# Patient Record
Sex: MALE | URBAN - NONMETROPOLITAN AREA
[De-identification: names, ages, dates, MRNs, and addresses within clinical notes are randomized per-mention and may not be internally consistent; named-entity substitution may affect disease eponyms.]

---

## 2021-05-17 ENCOUNTER — APPOINTMENT (OUTPATIENT)
Dept: URBAN - NONMETROPOLITAN AREA CLINIC 54 | Age: 81
Setting detail: DERMATOLOGY
End: 2021-05-17

## 2021-05-17 VITALS — HEIGHT: 71 IN | WEIGHT: 280 LBS

## 2021-05-17 VITALS — TEMPERATURE: 97.3 F | WEIGHT: 280 LBS | HEIGHT: 71 IN

## 2021-05-17 DIAGNOSIS — B07.8 OTHER VIRAL WARTS: ICD-10-CM

## 2021-05-17 PROBLEM — D48.5 NEOPLASM OF UNCERTAIN BEHAVIOR OF SKIN: Status: ACTIVE | Noted: 2021-05-17

## 2021-05-17 PROCEDURE — OTHER MIPS QUALITY: OTHER

## 2021-05-17 PROCEDURE — OTHER SHAVE REMOVAL: OTHER

## 2021-05-17 PROCEDURE — OTHER ADDITIONAL NOTES: OTHER

## 2021-05-17 PROCEDURE — OTHER COUNSELING: OTHER

## 2021-05-17 PROCEDURE — 11306 SHAVE SKIN LESION 0.6-1.0 CM: CPT

## 2021-05-17 PROCEDURE — A4550 SURGICAL TRAYS: HCPCS

## 2021-05-17 ASSESSMENT — LOCATION SIMPLE DESCRIPTION DERM: LOCATION SIMPLE: RIGHT THUMB

## 2021-05-17 ASSESSMENT — LOCATION DETAILED DESCRIPTION DERM: LOCATION DETAILED: RIGHT DISTAL RADIAL THUMB

## 2021-05-17 ASSESSMENT — LOCATION ZONE DERM: LOCATION ZONE: FINGER

## 2021-05-17 NOTE — HPI: WARTS (VERRUCA)
How Severe Are Your Warts?: moderate
Is This A New Presentation, Or A Follow-Up?: Wart
Treatment Number (Optional): 1
Additional History: Patient states his growth has been previously scraped and frozen.

## 2021-05-17 NOTE — PROCEDURE: MIPS QUALITY
Detail Level: Detailed
Quality 431: Preventive Care And Screening: Unhealthy Alcohol Use - Screening: Patient screened for unhealthy alcohol use using a single question and scores less than 2 times per year
Quality 130: Documentation Of Current Medications In The Medical Record: Current Medications Documented
Quality 226: Preventive Care And Screening: Tobacco Use: Screening And Cessation Intervention: Patient screened for tobacco use and is an ex/non-smoker
Statement Selected

## 2021-05-17 NOTE — PROCEDURE: SHAVE REMOVAL
Anesthesia Type: 1% lidocaine with epinephrine
Detail Level: Detailed
Add Variable For Additional Medical Justification: No
X Size Of Lesion In Cm (Optional): 1
Bill For Surgical Tray: yes
Medical Necessity Information: It is in your best interest to select a reason for this procedure from the list below. All of these items fulfill various CMS LCD requirements except the new and changing color options.
Path Notes (To The Dermatopathologist): Please check margins.
Post-Care Instructions: I reviewed with the patient in detail post-care instructions. Patient is to keep the biopsy site dry overnight, and then apply bacitracin twice daily until healed. Patient may apply hydrogen peroxide soaks to remove any crusting.
Billing Type: Third-Party Bill
Hemostasis: Aluminum Chloride
Consent was obtained from the patient. The risks and benefits to therapy were discussed in detail. Specifically, the risks of infection, scarring, bleeding, prolonged wound healing, incomplete removal, allergy to anesthesia, nerve injury and recurrence were addressed. Prior to the procedure, the treatment site was clearly identified and confirmed by the patient. All components of Universal Protocol/PAUSE Rule completed.
Biopsy Method: Dermablade
Notification Instructions: Patient will be notified of pathology results. However, patient instructed to call the office if not contacted within 2 weeks.
Wound Care: Petrolatum
Medical Necessity Clause: This procedure was medically necessary because the lesion that was treated was:

## 2021-06-14 ENCOUNTER — APPOINTMENT (OUTPATIENT)
Dept: URBAN - NONMETROPOLITAN AREA CLINIC 54 | Age: 81
Setting detail: DERMATOLOGY
End: 2021-06-14

## 2021-06-14 VITALS — TEMPERATURE: 97.3 F

## 2021-06-14 DIAGNOSIS — Z87.2 PERSONAL HISTORY OF DISEASES OF THE SKIN AND SUBCUTANEOUS TISSUE: ICD-10-CM

## 2021-06-14 DIAGNOSIS — L57.0 ACTINIC KERATOSIS: ICD-10-CM

## 2021-06-14 DIAGNOSIS — L82.0 INFLAMED SEBORRHEIC KERATOSIS: ICD-10-CM

## 2021-06-14 DIAGNOSIS — D18.0 HEMANGIOMA: ICD-10-CM

## 2021-06-14 DIAGNOSIS — D36.1 BENIGN NEOPLASM OF PERIPHERAL NERVES AND AUTONOMIC NERVOUS SYSTEM: ICD-10-CM

## 2021-06-14 DIAGNOSIS — L82.1 OTHER SEBORRHEIC KERATOSIS: ICD-10-CM

## 2021-06-14 PROBLEM — D36.11 BENIGN NEOPLASM OF PERIPHERAL NERVES AND AUTONOMIC NERVOUS SYSTEM OF FACE, HEAD, AND NECK: Status: ACTIVE | Noted: 2021-06-14

## 2021-06-14 PROBLEM — D18.01 HEMANGIOMA OF SKIN AND SUBCUTANEOUS TISSUE: Status: ACTIVE | Noted: 2021-06-14

## 2021-06-14 PROBLEM — C44.622 SQUAMOUS CELL CARCINOMA OF SKIN OF RIGHT UPPER LIMB, INCLUDING SHOULDER: Status: ACTIVE | Noted: 2021-06-14

## 2021-06-14 PROCEDURE — 17110 DESTRUCT B9 LESION 1-14: CPT

## 2021-06-14 PROCEDURE — OTHER PRESCRIPTION MEDICATION MANAGEMENT: OTHER

## 2021-06-14 PROCEDURE — OTHER ADDITIONAL NOTES: OTHER

## 2021-06-14 PROCEDURE — OTHER MIPS QUALITY: OTHER

## 2021-06-14 PROCEDURE — OTHER COUNSELING: OTHER

## 2021-06-14 PROCEDURE — OTHER LIQUID NITROGEN: OTHER

## 2021-06-14 PROCEDURE — 99213 OFFICE O/P EST LOW 20 MIN: CPT | Mod: 25

## 2021-06-14 PROCEDURE — OTHER MONITORING: OTHER

## 2021-06-14 PROCEDURE — OTHER PRESCRIPTION: OTHER

## 2021-06-14 RX ORDER — FLUOROURACIL 2 G/40G
CREAM TOPICAL BID
Qty: 1 | Refills: 1 | Status: ERX | COMMUNITY
Start: 2021-06-14

## 2021-06-14 ASSESSMENT — LOCATION SIMPLE DESCRIPTION DERM
LOCATION SIMPLE: RIGHT ANTERIOR NECK
LOCATION SIMPLE: LEFT FOREARM
LOCATION SIMPLE: CHEST
LOCATION SIMPLE: RIGHT FOREARM
LOCATION SIMPLE: RIGHT CALF
LOCATION SIMPLE: UPPER BACK
LOCATION SIMPLE: ABDOMEN

## 2021-06-14 ASSESSMENT — LOCATION DETAILED DESCRIPTION DERM
LOCATION DETAILED: LEFT DISTAL DORSAL FOREARM
LOCATION DETAILED: LEFT MEDIAL SUPERIOR CHEST
LOCATION DETAILED: RIGHT CLAVICULAR NECK
LOCATION DETAILED: SUPERIOR THORACIC SPINE
LOCATION DETAILED: RIGHT PROXIMAL DORSAL FOREARM
LOCATION DETAILED: RIGHT PROXIMAL LATERAL CALF
LOCATION DETAILED: RIGHT DISTAL CALF
LOCATION DETAILED: PERIUMBILICAL SKIN

## 2021-06-14 ASSESSMENT — LOCATION ZONE DERM
LOCATION ZONE: TRUNK
LOCATION ZONE: LEG
LOCATION ZONE: ARM
LOCATION ZONE: NECK

## 2021-06-14 NOTE — PROCEDURE: PRESCRIPTION MEDICATION MANAGEMENT
Render In Strict Bullet Format?: No
Detail Level: Zone
Initiate Treatment: Efudex 5 % topical cream BID\\nDays Supply: 30\\nSig: Apply to AA of forearm BID x 4 weeks

## 2021-06-14 NOTE — PROCEDURE: ADDITIONAL NOTES
Render Risk Assessment In Note?: no
Additional Notes: Biopsy proven. Patient followed up with Dr. Klein who recommended referral to Dr. Frances for Mohs. Dr. Frances, however, does not accept patient's insurance. Per patient, Dr. Klein and Dr. Frances are coming up with a plan for treatment and he is awaiting a call from their offices.
Detail Level: Simple

## 2021-06-14 NOTE — PROCEDURE: LIQUID NITROGEN
Post-Care Instructions: I reviewed with the patient in detail post-care instructions. Patient is to wear sunprotection, and avoid picking at any of the treated lesions. Pt may apply Vaseline to crusted or scabbing areas.
Medical Necessity Clause: This procedure was medically necessary because the lesions that were treated were:
Consent: The patient's consent was obtained including but not limited to risks of crusting, scabbing, blistering, scarring, darker or lighter pigmentary change, recurrence, incomplete removal and infection.
Medical Necessity Information: It is in your best interest to select a reason for this procedure from the list below. All of these items fulfill various CMS LCD requirements except the new and changing color options.
Add 52 Modifier (Optional): no
Duration Of Freeze Thaw-Cycle (Seconds): 5-10
Number Of Freeze-Thaw Cycles: 2 freeze-thaw cycles
Include Z78.9 (Other Specified Conditions Influencing Health Status) As An Associated Diagnosis?: Yes
Detail Level: Detailed

## 2021-08-09 ENCOUNTER — APPOINTMENT (OUTPATIENT)
Dept: URBAN - NONMETROPOLITAN AREA CLINIC 54 | Age: 81
Setting detail: DERMATOLOGY
End: 2021-08-09

## 2021-08-09 DIAGNOSIS — Z85.828 PERSONAL HISTORY OF OTHER MALIGNANT NEOPLASM OF SKIN: ICD-10-CM

## 2021-08-09 DIAGNOSIS — Z87.2 PERSONAL HISTORY OF DISEASES OF THE SKIN AND SUBCUTANEOUS TISSUE: ICD-10-CM

## 2021-08-09 DIAGNOSIS — L57.0 ACTINIC KERATOSIS: ICD-10-CM

## 2021-08-09 PROCEDURE — OTHER MONITORING: OTHER

## 2021-08-09 PROCEDURE — OTHER MIPS QUALITY: OTHER

## 2021-08-09 PROCEDURE — OTHER SKIN CANCER FOLLOW-UP: OTHER

## 2021-08-09 PROCEDURE — OTHER PRESCRIPTION MEDICATION MANAGEMENT: OTHER

## 2021-08-11 ENCOUNTER — APPOINTMENT (OUTPATIENT)
Dept: URBAN - NONMETROPOLITAN AREA CLINIC 54 | Age: 81
Setting detail: DERMATOLOGY
End: 2021-08-11

## 2021-08-11 DIAGNOSIS — Z87.2 PERSONAL HISTORY OF DISEASES OF THE SKIN AND SUBCUTANEOUS TISSUE: ICD-10-CM

## 2021-08-11 DIAGNOSIS — L98419 CHRONIC ULCER OF OTHER SPECIFIED SITES: ICD-10-CM

## 2021-08-11 DIAGNOSIS — Z85.828 PERSONAL HISTORY OF OTHER MALIGNANT NEOPLASM OF SKIN: ICD-10-CM

## 2021-08-11 DIAGNOSIS — L57.0 ACTINIC KERATOSIS: ICD-10-CM

## 2021-08-11 DIAGNOSIS — L98429 CHRONIC ULCER OF OTHER SPECIFIED SITES: ICD-10-CM

## 2021-08-11 PROBLEM — L97.219 NON-PRESSURE CHRONIC ULCER OF RIGHT CALF WITH UNSPECIFIED SEVERITY: Status: ACTIVE | Noted: 2021-08-11

## 2021-08-11 PROBLEM — C44.92 SQUAMOUS CELL CARCINOMA OF SKIN, UNSPECIFIED: Status: ACTIVE | Noted: 2021-08-11

## 2021-08-11 PROCEDURE — OTHER COUNSELING: OTHER

## 2021-08-11 PROCEDURE — OTHER DEFER: OTHER

## 2021-08-11 PROCEDURE — OTHER ADDITIONAL NOTES: OTHER

## 2021-08-11 PROCEDURE — OTHER SKIN CANCER FOLLOW-UP: OTHER

## 2021-08-11 PROCEDURE — OTHER MONITORING: OTHER

## 2021-08-11 PROCEDURE — OTHER PRESCRIPTION MEDICATION MANAGEMENT: OTHER

## 2021-08-11 PROCEDURE — 99213 OFFICE O/P EST LOW 20 MIN: CPT

## 2021-08-11 PROCEDURE — OTHER MIPS QUALITY: OTHER

## 2021-08-11 ASSESSMENT — LOCATION DETAILED DESCRIPTION DERM
LOCATION DETAILED: RIGHT PROXIMAL CALF
LOCATION DETAILED: DORSAL INTERPHALANGEAL JOINT RIGHT THUMB

## 2021-08-11 ASSESSMENT — LOCATION SIMPLE DESCRIPTION DERM
LOCATION SIMPLE: RIGHT CALF
LOCATION SIMPLE: RIGHT THUMB

## 2021-08-11 ASSESSMENT — LOCATION ZONE DERM
LOCATION ZONE: LEG
LOCATION ZONE: FINGER

## 2021-08-11 NOTE — PROCEDURE: SKIN CANCER FOLLOW-UP
Detail Level: Detailed
Additional Instructions: Standard
Invasive Melanoma Counseling: I stressed the importance of regular monthly self-examinations. They should examine their entire skin surface. The buttocks, gluteal cleft, genitalia and bottom of the feet should be examined with a hand held mirror. A significant other should help them if they are comfortable with this.

## 2021-08-11 NOTE — PROCEDURE: DEFER
Detail Level: Detailed
Introduction Text (Please End With A Colon): The following procedure was deferred:
Other Procedure: Cryosurgery

## 2021-08-11 NOTE — PROCEDURE: ADDITIONAL NOTES
Detail Level: Simple
Render Risk Assessment In Note?: no
Additional Notes: Biopsy proven. Patient followed up with Dr. Klein who recommended referral to Dr. Frances for Mohs. Dr. Frances, however, does not accept patient's insurance. He has been waiting to hear from Dr. Frances about alternative options but has yet to be contacted. Will place a call to Dr. Frances's office in order to see if he has made any progress on finding and alternative treatment option.\\n\\nBiopsy site is well-healed with no current evidence of recurrence.
Additional Notes: Vaseline and bandage applied. Patient states he accidently aggravated this site.
Additional Notes: Patient states he only remembered to use the cream “sometimes” and reports no superficial reaction. Recommended cryosurgery to treat hyperkeratotic lesions today. He is having a surgery next week and would like to postpone cryosurgery treatment until his next office visit.

## 2021-08-11 NOTE — PROCEDURE: PRESCRIPTION MEDICATION MANAGEMENT
Continue Regimen: Efudex 5 % topical cream BID\\nDays Supply: 30\\nSig: Apply to AA of forearm BID x 4 weeks
Detail Level: Zone
Render In Strict Bullet Format?: No

## 2022-03-01 ENCOUNTER — APPOINTMENT (OUTPATIENT)
Dept: URBAN - NONMETROPOLITAN AREA CLINIC 54 | Age: 82
Setting detail: DERMATOLOGY
End: 2022-03-01

## 2022-03-01 DIAGNOSIS — L57.0 ACTINIC KERATOSIS: ICD-10-CM

## 2022-03-01 DIAGNOSIS — Z87.2 PERSONAL HISTORY OF DISEASES OF THE SKIN AND SUBCUTANEOUS TISSUE: ICD-10-CM

## 2022-03-01 DIAGNOSIS — Z85.828 PERSONAL HISTORY OF OTHER MALIGNANT NEOPLASM OF SKIN: ICD-10-CM

## 2022-03-01 PROCEDURE — OTHER MONITORING: OTHER

## 2022-03-01 PROCEDURE — OTHER ADDITIONAL NOTES: OTHER

## 2022-03-01 PROCEDURE — OTHER SKIN CANCER FOLLOW-UP: OTHER

## 2022-03-01 PROCEDURE — OTHER MIPS QUALITY: OTHER

## 2022-03-01 ASSESSMENT — LOCATION DETAILED DESCRIPTION DERM: LOCATION DETAILED: DORSAL INTERPHALANGEAL JOINT RIGHT THUMB

## 2022-03-01 ASSESSMENT — LOCATION ZONE DERM: LOCATION ZONE: FINGER

## 2022-03-01 ASSESSMENT — LOCATION SIMPLE DESCRIPTION DERM: LOCATION SIMPLE: RIGHT THUMB

## 2022-03-01 NOTE — PROCEDURE: ADDITIONAL NOTES
Detail Level: Simple
Additional Notes: Biopsy proven. Patient followed up with Dr. Klein who recommended referral to Dr. Frances for Mohs. Dr. Frances, however, does not accept patient's insurance. He has been waiting to hear from Dr. Frances about alternative options but has yet to be contacted. Will place a call to Dr. Frances's office in order to see if he has made any progress on finding and alternative treatment option.\\n\\nBiopsy site is well-healed with no current evidence of recurrence.
Render Risk Assessment In Note?: no

## 2022-10-07 ENCOUNTER — APPOINTMENT (OUTPATIENT)
Dept: URBAN - NONMETROPOLITAN AREA CLINIC 54 | Age: 82
Setting detail: DERMATOLOGY
End: 2022-10-08

## 2022-10-07 DIAGNOSIS — Z87.2 PERSONAL HISTORY OF DISEASES OF THE SKIN AND SUBCUTANEOUS TISSUE: ICD-10-CM

## 2022-10-07 DIAGNOSIS — L57.8 OTHER SKIN CHANGES DUE TO CHRONIC EXPOSURE TO NONIONIZING RADIATION: ICD-10-CM

## 2022-10-07 DIAGNOSIS — B07.8 OTHER VIRAL WARTS: ICD-10-CM

## 2022-10-07 DIAGNOSIS — L82.0 INFLAMED SEBORRHEIC KERATOSIS: ICD-10-CM

## 2022-10-07 DIAGNOSIS — L82.1 OTHER SEBORRHEIC KERATOSIS: ICD-10-CM

## 2022-10-07 DIAGNOSIS — D18.0 HEMANGIOMA: ICD-10-CM

## 2022-10-07 DIAGNOSIS — Z85.828 PERSONAL HISTORY OF OTHER MALIGNANT NEOPLASM OF SKIN: ICD-10-CM

## 2022-10-07 PROBLEM — D18.01 HEMANGIOMA OF SKIN AND SUBCUTANEOUS TISSUE: Status: ACTIVE | Noted: 2022-10-07

## 2022-10-07 PROCEDURE — 99213 OFFICE O/P EST LOW 20 MIN: CPT | Mod: 25

## 2022-10-07 PROCEDURE — 17110 DESTRUCT B9 LESION 1-14: CPT

## 2022-10-07 PROCEDURE — OTHER MIPS QUALITY: OTHER

## 2022-10-07 PROCEDURE — OTHER COUNSELING: OTHER

## 2022-10-07 PROCEDURE — OTHER SUNSCREEN RECOMMENDATIONS: OTHER

## 2022-10-07 PROCEDURE — OTHER LIQUID NITROGEN: OTHER

## 2022-10-07 PROCEDURE — OTHER MONITORING: OTHER

## 2022-10-07 PROCEDURE — OTHER ADDITIONAL NOTES: OTHER

## 2022-10-07 PROCEDURE — OTHER SKIN CANCER FOLLOW-UP: OTHER

## 2022-10-07 ASSESSMENT — LOCATION DETAILED DESCRIPTION DERM
LOCATION DETAILED: LEFT SUPERIOR POSTERIOR HELIX
LOCATION DETAILED: LEFT PROXIMAL DORSAL FOREARM
LOCATION DETAILED: RIGHT SUPERIOR CRUS OF ANTIHELIX
LOCATION DETAILED: DORSAL INTERPHALANGEAL JOINT RIGHT THUMB
LOCATION DETAILED: RIGHT SUPERIOR POSTERIOR HELIX
LOCATION DETAILED: INFERIOR THORACIC SPINE
LOCATION DETAILED: PERIUMBILICAL SKIN
LOCATION DETAILED: LEFT RIB CAGE
LOCATION DETAILED: RIGHT LATERAL ABDOMEN
LOCATION DETAILED: STERNUM
LOCATION DETAILED: LEFT INFERIOR POSTERIOR HELIX
LOCATION DETAILED: LEFT CENTRAL POSTAURICULAR SKIN
LOCATION DETAILED: RIGHT DISTAL DORSAL FOREARM
LOCATION DETAILED: LEFT SUPERIOR UPPER BACK
LOCATION DETAILED: EPIGASTRIC SKIN

## 2022-10-07 ASSESSMENT — LOCATION SIMPLE DESCRIPTION DERM
LOCATION SIMPLE: LEFT EAR
LOCATION SIMPLE: RIGHT FOREARM
LOCATION SIMPLE: UPPER BACK
LOCATION SIMPLE: ABDOMEN
LOCATION SIMPLE: SCALP
LOCATION SIMPLE: RIGHT EAR
LOCATION SIMPLE: RIGHT THUMB
LOCATION SIMPLE: LEFT UPPER BACK
LOCATION SIMPLE: CHEST
LOCATION SIMPLE: LEFT FOREARM

## 2022-10-07 ASSESSMENT — LOCATION ZONE DERM
LOCATION ZONE: TRUNK
LOCATION ZONE: FINGER
LOCATION ZONE: SCALP
LOCATION ZONE: ARM
LOCATION ZONE: EAR

## 2022-10-07 NOTE — PROCEDURE: LIQUID NITROGEN
Medical Necessity Clause: This procedure was medically necessary because the lesions that were treated were:
Medical Necessity Information: It is in your best interest to select a reason for this procedure from the list below. All of these items fulfill various CMS LCD requirements except the new and changing color options.
Spray Paint Technique: No
Render Post-Care Instructions In Note?: yes
Post-Care Instructions: I reviewed with the patient in detail post-care instructions. Patient is to wear sunprotection, and avoid picking at any of the treated lesions. Pt may apply Vaseline to crusted or scabbing areas.
Number Of Freeze-Thaw Cycles: 2 freeze-thaw cycles
Spray Paint Text: The liquid nitrogen was applied to the skin utilizing a spray paint frosting technique.
Consent: The patient's consent was obtained including but not limited to risks of crusting, scabbing, blistering, scarring, darker or lighter pigmentary change, recurrence, incomplete removal and infection.
Duration Of Freeze Thaw-Cycle (Seconds): 5-10
Detail Level: Detailed

## 2022-10-07 NOTE — HPI: SKIN LESIONS
How Severe Is Your Skin Lesion?: moderate
Is This A New Presentation, Or A Follow-Up?: Skin Lesion
Additional History: Referral from Vikram lovell , several places on chest and behind ears , been applying hydrocortisone cream

## 2022-10-07 NOTE — PROCEDURE: ADDITIONAL NOTES
Detail Level: Detailed
Additional Notes: No evidence of recurrence. Will continue to monitor for changes.
Render Risk Assessment In Note?: no

## 2024-04-18 NOTE — PROCEDURE: ADDITIONAL NOTES
Render Risk Assessment In Note?: no
Additional Notes: Large verruca with cutaneous horn present today. Recommended debulking via shave removal. Will follow-up in 3 weeks to treat with cryosurgery vs candida.
5
Detail Level: Simple
